# Patient Record
Sex: MALE | Race: WHITE | NOT HISPANIC OR LATINO | Employment: FULL TIME | URBAN - METROPOLITAN AREA
[De-identification: names, ages, dates, MRNs, and addresses within clinical notes are randomized per-mention and may not be internally consistent; named-entity substitution may affect disease eponyms.]

---
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
## 2018-01-02 ENCOUNTER — OFFICE VISIT (OUTPATIENT)
Dept: CARDIAC SURGERY | Facility: CLINIC | Age: 18
End: 2018-01-02

## 2018-01-02 VITALS
HEART RATE: 79 BPM | OXYGEN SATURATION: 96 % | HEIGHT: 69 IN | BODY MASS INDEX: 19.26 KG/M2 | WEIGHT: 130 LBS | DIASTOLIC BLOOD PRESSURE: 69 MMHG | TEMPERATURE: 98.5 F | SYSTOLIC BLOOD PRESSURE: 106 MMHG

## 2018-01-02 DIAGNOSIS — J93.83 SPONTANEOUS PNEUMOTHORAX: ICD-10-CM

## 2018-01-02 DIAGNOSIS — J93.11 PRIMARY SPONTANEOUS PNEUMOTHORAX: Primary | ICD-10-CM

## 2018-01-02 PROCEDURE — 99024 POSTOP FOLLOW-UP VISIT: CPT | Performed by: PHYSICIAN ASSISTANT

## 2018-01-02 RX ORDER — FLUOXETINE 10 MG/1
10 CAPSULE ORAL DAILY
COMMUNITY

## 2018-01-02 NOTE — PROGRESS NOTES
01/02/2018  Patient Information  Vincent Cornejo [facility] [place] [address]   2000  'PCP/Referring Physician'  Estefany Valadez, APRN  [phone]  No ref. provider found    Chief Complaint   Patient presents with   • Post-op     Hospital follow up s/p right upper lobe wedge resection 12/8/17,has been doing well since surgery.       History of Present Illness:  Patient presents to office today for postoperative follow-up of right VATS with right upper lobe apical wedge resection and mechanical pleurodesis on 12/8/17.  Patient denies chest pain or shortness of breath and states that he is ambulating well.  Patient denies tobacco abuse.  The patient does state he would like to try scuba diving.  He is otherwise recovering well, and is looking forward to resuming normal physical activity.    Patient Active Problem List   Diagnosis   • Spontaneous pneumothorax   • Smokeless tobacco use     Past Medical History:   Diagnosis Date   • Pneumothorax      Past Surgical History:   Procedure Laterality Date   • ENDOSCOPY     • THORACOSCOPY Right 12/8/2017    Procedure: BROCHOSCOPY, RIGHT VIDEO ASSISTED THORACOSCOPY, PLERADESIS, MECHANICAL PLEURODESIS, RIGHT UPPER LOBE WEDGE RESECTION;  Surgeon: Zach Zayas MD;  Location: [facility];  Service:    • TONSILLECTOMY         Current Outpatient Prescriptions:   •  FLUoxetine (PROzac) 10 MG capsule, Take 10 mg by mouth Daily., Disp: , Rfl:   •  HYDROcodone-acetaminophen (NORCO) 7.5-325 MG per tablet, Take 1 tablet by mouth Every 6 (Six) Hours As Needed for Moderate Pain ., Disp: 30 tablet, Rfl: 0  •  ibuprofen (ADVIL,MOTRIN) 600 MG tablet, Take 600 mg by mouth Every 6 (Six) Hours As Needed for Mild Pain ., Disp: , Rfl:   No Known Allergies  Social History     Social History   • Marital status: Single     Spouse name: N/A   • Number of children: N/A   • Years of education:  "N/A     Occupational History   • Not on file.     Social History Main Topics   • Smoking status: Former Smoker     Packs/day: 0.50     Years: 3.00     Types: Cigarettes     Quit date: 2017   • Smokeless tobacco: Former User     Types: Chew, Snuff   • Alcohol use No   • Drug use: Yes     Special: Marijuana   • Sexual activity: Defer     Other Topics Concern   • Not on file     Social History Narrative     Family History   Problem Relation Age of Onset   • No Known Problems Mother    • No Known Problems Father      ROS: As per HPI, otherwise negative.   Vitals:    18 1412   BP: 106/69   BP Location: Right arm   Pulse: 79   Temp: 98.5 °F (36.9 °C)   SpO2: 96%   Weight: 59 kg (130 lb)   Height: 175.3 cm (69\")      Physical Exam:  Gen - NAD, pleasant, cooperative  CV - Regular rate and rhythm, no murmur gallop or rub  Pulm - Lungs clear to auscultation without wheeze or rhonchi   GI - Soft, normoactive bowel sounds, non-tender  Ext - Without edema  Incision - Healing well, no evidence of dehiscence or cellulitis     Labs/Imagin18 CXR  Good quality chest radiograph. Bony structures are within normal limits. Trachea is midline. Left and right lung fields clear, costophrenic angles are sharp. No evidence of pleural effusion or pneumothorax. Compared to most recent chest xray (17), there is no acute pulmonary disease.     Assessment/Plan:  Patient is a 17-year-old  male with history of spontaneous pneumothorax status post right VATS with right apical wedge resection and mechanical pleurodesis on 17.  Patient is having a stable postoperative course.  He denies incisional soreness or shortness of breath.  His incisions are healing well and his chest x-ray is within normal limits.  I discussed the importance of cessation from tobacco abuse, especially cigarettes.  I advised that he avoid scuba diving to prevent further injury.  Patient may follow-up with our office as needed, but should call " with any questions or concerns regarding his surgery, should any arise.    Patient Active Problem List   Diagnosis   • Spontaneous pneumothorax   • Smokeless tobacco use     Signed by:

## 2020-05-15 ENCOUNTER — HOSPITAL ENCOUNTER (OUTPATIENT)
Age: 20
End: 2020-05-15
Payer: COMMERCIAL

## 2020-05-15 DIAGNOSIS — R10.84: Primary | ICD-10-CM

## 2020-05-15 LAB
ALBUMIN LEVEL: 5.4 G/DL (ref 3.5–5)
ALBUMIN/GLOB SERPL: 1.9 {RATIO} (ref 1.1–1.8)
ALP ISO SERPL-ACNC: 128 U/L (ref 38–126)
ALT SERPLBLD-CCNC: 59 U/L (ref 12–78)
AMYLASE SERPL-CCNC: 57 U/L (ref 30–110)
ANION GAP SERPL CALC-SCNC: 13 MEQ/L (ref 5–15)
AST SERPL QL: 37 U/L (ref 17–59)
BILIRUBIN,TOTAL: 0.5 MG/DL (ref 0.2–1.3)
BUN SERPL-MCNC: 19 MG/DL (ref 9–20)
CALCIUM SPEC-MCNC: 10 MG/DL (ref 8.4–10.2)
CHLORIDE SPEC-SCNC: 103 MMOL/L (ref 98–107)
CO2 SERPL-SCNC: 28 MMOL/L (ref 22–30)
COLOR UR: (no result)
CREAT BLD-SCNC: 0.9 MG/DL (ref 0.66–1.25)
ESTIMATED GLOMERULAR FILT RATE: 107 ML/MIN (ref 60–?)
GFR (AFRICAN AMERICAN): 129 ML/MIN (ref 60–?)
GLOBULIN SER CALC-MCNC: 2.8 G/DL (ref 1.3–3.2)
GLUCOSE: 96 MG/DL (ref 74–100)
HCT VFR BLD CALC: 46.2 % (ref 42–52)
HGB BLD-MCNC: 15.3 G/DL (ref 14.1–18)
LIPASE: 65 U/L (ref 23–300)
MCHC RBC-ENTMCNC: 33.1 G/DL (ref 31.8–35.4)
MCV RBC: 87.1 FL (ref 80–94)
MEAN CORPUSCULAR HEMOGLOBIN: 28.8 PG (ref 27–31.2)
MICRO URNS: (no result)
MUCOUS THREADS URNS QL MICRO: (no result) /LPF
PH UR: 6 [PH] (ref 5–8.5)
PLATELET # BLD: 275 K/MM3 (ref 142–424)
POTASSIUM: 4 MMOL/L (ref 3.5–5.1)
PROT SERPL-MCNC: 8.2 G/DL (ref 6.3–8.2)
PROT UR STRIP-MCNC: (no result) MG/DL
RBC # BLD AUTO: 5.3 M/MM3 (ref 4.6–6.2)
SODIUM SPEC-SCNC: 140 MMOL/L (ref 136–145)
SP GR UR: 1.02 (ref 1–1.03)
SQUAMOUS URNS QL MICRO: (no result) #/HPF (ref 0–5)
UROBILINOGEN UR QL: 0.2 EU/DL
WBC # BLD AUTO: 8.2 K/MM3 (ref 4.8–10.8)
WBC # UR: (no result) #/HPF (ref 0–3)

## 2020-05-15 PROCEDURE — 36415 COLL VENOUS BLD VENIPUNCTURE: CPT

## 2020-05-15 PROCEDURE — 83690 ASSAY OF LIPASE: CPT

## 2020-05-15 PROCEDURE — 81001 URINALYSIS AUTO W/SCOPE: CPT

## 2020-05-15 PROCEDURE — 85025 COMPLETE CBC W/AUTO DIFF WBC: CPT

## 2020-05-15 PROCEDURE — 82150 ASSAY OF AMYLASE: CPT

## 2020-05-15 PROCEDURE — 80053 COMPREHEN METABOLIC PANEL: CPT

## (undated) DEVICE — CANNULA,OXY,ADULT,SUPERSOFT,W/7'TUB,UC: Brand: MEDLINE

## (undated) DEVICE — AIRWY 90MM NO9

## (undated) DEVICE — SKIN AFFIX SURG ADHESIVE 72/CS 0.55ML: Brand: MEDLINE

## (undated) DEVICE — SAFESECURE,SECUREMENT,FOLEY CATH,STERILE: Brand: MEDLINE

## (undated) DEVICE — DRN WND CH RND FUL/FLUT NO/TROC 3/8IN 28F

## (undated) DEVICE — SUT VIC 2/0 CT2 27IN J269H

## (undated) DEVICE — MEDI-VAC YANKAUER SUCTION HANDLE W/BULBOUS TIP: Brand: CARDINAL HEALTH

## (undated) DEVICE — MEDI-VAC NON-CONDUCTIVE SUCTION TUBING: Brand: CARDINAL HEALTH

## (undated) DEVICE — SUT MNCRYL PLS ANTIB UD 4/0 PS2 18IN

## (undated) DEVICE — VISUALIZATION SYSTEM: Brand: CLEARIFY

## (undated) DEVICE — PK THORACOTOMY 10

## (undated) DEVICE — SCOPE WARMER THAT PRE-WARMS MULTIPLE LAPAROSCOPES.: Brand: JOSNOE MEDICAL INC

## (undated) DEVICE — DRSNG WND BORDR/ADHS NONADHR/GZ LF 2X2IN STRL

## (undated) DEVICE — ELECTRD BLD EXT EDGE/INSUL 6IN

## (undated) DEVICE — CLTH CLENS READYCLEANSE PERI CARE PK/5

## (undated) DEVICE — ELECTRD BLD EDGE/INSUL1P 2.4X5.1MM STRL

## (undated) DEVICE — ECHELON FLEX 60 ARTICULATING ENDOSCOPIC LINEAR CUTTER (NO CARTRIDGE): Brand: ECHELON FLEX ENDOPATH

## (undated) DEVICE — OASIS DRAIN, SINGLE, INLINE & ATS COMPATIBLE: Brand: OASIS

## (undated) DEVICE — SYR LL TP 10ML STRL

## (undated) DEVICE — GLV SURG SENSICARE W/ALOE PF LF 7 STRL

## (undated) DEVICE — TOTAL TRAY, 16FR 10ML SIL FOLEY, URN: Brand: MEDLINE

## (undated) DEVICE — SPNG GZ WOVN 4X4IN 12PLY 10/BX STRL

## (undated) DEVICE — MAGNETIC DRAPE: Brand: DEVON

## (undated) DEVICE — DEFOGGER!" ANTI FOG KIT: Brand: DEROYAL

## (undated) DEVICE — SUT ETHIB 1 CT1 30IN  X425H

## (undated) DEVICE — SUCTION CANISTER, 2500CC, RIGID: Brand: DEROYAL

## (undated) DEVICE — LAPAROSCOPIC TROCAR SLEEVE: Brand: DISPOSABLE CANNULAS AND SEALS

## (undated) DEVICE — TISSUE RETRIEVAL SYSTEM: Brand: INZII RETRIEVAL SYSTEM

## (undated) DEVICE — TP CLTH MEDIPORE SFT 2IN 10YD

## (undated) DEVICE — NDL HYPO ECLPS SFTY 22G 1 1/2IN

## (undated) DEVICE — CVR HNDL LT SURG ACCSSRY BLU STRL

## (undated) DEVICE — ANTIBACTERIAL UNDYED BRAIDED (POLYGLACTIN 910), SYNTHETIC ABSORBABLE SURGICAL SUTURE: Brand: COATED VICRYL

## (undated) DEVICE — TRAP,MUCUS SPECIMEN,40CC: Brand: MEDLINE